# Patient Record
Sex: FEMALE | Race: WHITE | NOT HISPANIC OR LATINO | Employment: FULL TIME | URBAN - METROPOLITAN AREA
[De-identification: names, ages, dates, MRNs, and addresses within clinical notes are randomized per-mention and may not be internally consistent; named-entity substitution may affect disease eponyms.]

---

## 2021-05-13 ENCOUNTER — HOSPITAL ENCOUNTER (EMERGENCY)
Facility: MEDICAL CENTER | Age: 21
End: 2021-05-14
Attending: EMERGENCY MEDICINE
Payer: OTHER GOVERNMENT

## 2021-05-13 ENCOUNTER — APPOINTMENT (OUTPATIENT)
Dept: RADIOLOGY | Facility: MEDICAL CENTER | Age: 21
End: 2021-05-13
Attending: EMERGENCY MEDICINE
Payer: OTHER GOVERNMENT

## 2021-05-13 VITALS
WEIGHT: 126.1 LBS | BODY MASS INDEX: 20.27 KG/M2 | DIASTOLIC BLOOD PRESSURE: 74 MMHG | OXYGEN SATURATION: 94 % | TEMPERATURE: 98.7 F | HEART RATE: 82 BPM | SYSTOLIC BLOOD PRESSURE: 117 MMHG | RESPIRATION RATE: 16 BRPM | HEIGHT: 66 IN

## 2021-05-13 DIAGNOSIS — R55 SYNCOPE, UNSPECIFIED SYNCOPE TYPE: ICD-10-CM

## 2021-05-13 DIAGNOSIS — S02.2XXA CLOSED FRACTURE OF NASAL BONE, INITIAL ENCOUNTER: ICD-10-CM

## 2021-05-13 DIAGNOSIS — R04.0 EPISTAXIS: ICD-10-CM

## 2021-05-13 LAB
ALBUMIN SERPL BCP-MCNC: 4.3 G/DL (ref 3.2–4.9)
ALBUMIN/GLOB SERPL: 1.3 G/DL
ALP SERPL-CCNC: 104 U/L (ref 30–99)
ALT SERPL-CCNC: 18 U/L (ref 2–50)
ANION GAP SERPL CALC-SCNC: 10 MMOL/L (ref 7–16)
AST SERPL-CCNC: 24 U/L (ref 12–45)
BASOPHILS # BLD AUTO: 0.6 % (ref 0–1.8)
BASOPHILS # BLD: 0.04 K/UL (ref 0–0.12)
BILIRUB SERPL-MCNC: 0.2 MG/DL (ref 0.1–1.5)
BUN SERPL-MCNC: 9 MG/DL (ref 8–22)
CALCIUM SERPL-MCNC: 9.3 MG/DL (ref 8.5–10.5)
CHLORIDE SERPL-SCNC: 108 MMOL/L (ref 96–112)
CO2 SERPL-SCNC: 25 MMOL/L (ref 20–33)
CREAT SERPL-MCNC: 0.79 MG/DL (ref 0.5–1.4)
EKG IMPRESSION: NORMAL
EOSINOPHIL # BLD AUTO: 0.05 K/UL (ref 0–0.51)
EOSINOPHIL NFR BLD: 0.7 % (ref 0–6.9)
ERYTHROCYTE [DISTWIDTH] IN BLOOD BY AUTOMATED COUNT: 36.2 FL (ref 35.9–50)
GLOBULIN SER CALC-MCNC: 3.3 G/DL (ref 1.9–3.5)
GLUCOSE SERPL-MCNC: 113 MG/DL (ref 65–99)
HCG SERPL QL: NEGATIVE
HCT VFR BLD AUTO: 45.3 % (ref 37–47)
HGB BLD-MCNC: 15 G/DL (ref 12–16)
IMM GRANULOCYTES # BLD AUTO: 0.02 K/UL (ref 0–0.11)
IMM GRANULOCYTES NFR BLD AUTO: 0.3 % (ref 0–0.9)
LYMPHOCYTES # BLD AUTO: 1.77 K/UL (ref 1–4.8)
LYMPHOCYTES NFR BLD: 26.2 % (ref 22–41)
MAGNESIUM SERPL-MCNC: 2.4 MG/DL (ref 1.5–2.5)
MCH RBC QN AUTO: 29.5 PG (ref 27–33)
MCHC RBC AUTO-ENTMCNC: 33.1 G/DL (ref 33.6–35)
MCV RBC AUTO: 89 FL (ref 81.4–97.8)
MONOCYTES # BLD AUTO: 0.59 K/UL (ref 0–0.85)
MONOCYTES NFR BLD AUTO: 8.7 % (ref 0–13.4)
NEUTROPHILS # BLD AUTO: 4.29 K/UL (ref 2–7.15)
NEUTROPHILS NFR BLD: 63.5 % (ref 44–72)
NRBC # BLD AUTO: 0 K/UL
NRBC BLD-RTO: 0 /100 WBC
PHOSPHATE SERPL-MCNC: 3.2 MG/DL (ref 2.5–4.5)
PLATELET # BLD AUTO: 335 K/UL (ref 164–446)
PMV BLD AUTO: 10.8 FL (ref 9–12.9)
POTASSIUM SERPL-SCNC: 4.3 MMOL/L (ref 3.6–5.5)
PROT SERPL-MCNC: 7.6 G/DL (ref 6–8.2)
RBC # BLD AUTO: 5.09 M/UL (ref 4.2–5.4)
SODIUM SERPL-SCNC: 143 MMOL/L (ref 135–145)
TSH SERPL DL<=0.005 MIU/L-ACNC: 0.39 UIU/ML (ref 0.38–5.33)
WBC # BLD AUTO: 6.8 K/UL (ref 4.8–10.8)

## 2021-05-13 PROCEDURE — 84100 ASSAY OF PHOSPHORUS: CPT

## 2021-05-13 PROCEDURE — 84443 ASSAY THYROID STIM HORMONE: CPT

## 2021-05-13 PROCEDURE — 700102 HCHG RX REV CODE 250 W/ 637 OVERRIDE(OP): Performed by: EMERGENCY MEDICINE

## 2021-05-13 PROCEDURE — 84703 CHORIONIC GONADOTROPIN ASSAY: CPT

## 2021-05-13 PROCEDURE — A9270 NON-COVERED ITEM OR SERVICE: HCPCS | Performed by: EMERGENCY MEDICINE

## 2021-05-13 PROCEDURE — 93005 ELECTROCARDIOGRAM TRACING: CPT | Performed by: EMERGENCY MEDICINE

## 2021-05-13 PROCEDURE — 99284 EMERGENCY DEPT VISIT MOD MDM: CPT

## 2021-05-13 PROCEDURE — 85025 COMPLETE CBC W/AUTO DIFF WBC: CPT

## 2021-05-13 PROCEDURE — 70486 CT MAXILLOFACIAL W/O DYE: CPT

## 2021-05-13 PROCEDURE — 80053 COMPREHEN METABOLIC PANEL: CPT

## 2021-05-13 PROCEDURE — 93005 ELECTROCARDIOGRAM TRACING: CPT

## 2021-05-13 PROCEDURE — 83735 ASSAY OF MAGNESIUM: CPT

## 2021-05-13 PROCEDURE — 700101 HCHG RX REV CODE 250: Performed by: EMERGENCY MEDICINE

## 2021-05-13 RX ORDER — LIDOCAINE HYDROCHLORIDE AND EPINEPHRINE 10; 10 MG/ML; UG/ML
20 INJECTION, SOLUTION INFILTRATION; PERINEURAL ONCE
Status: COMPLETED | OUTPATIENT
Start: 2021-05-13 | End: 2021-05-13

## 2021-05-13 RX ADMIN — PHENYLEPHRINE HYDROCHLORIDE 1 SPRAY: 0.25 SPRAY NASAL at 22:00

## 2021-05-13 RX ADMIN — LIDOCAINE HYDROCHLORIDE,EPINEPHRINE BITARTRATE 20 ML: 10; .01 INJECTION, SOLUTION INFILTRATION; PERINEURAL at 22:00

## 2021-05-14 LAB
APPEARANCE UR: ABNORMAL
BACTERIA #/AREA URNS HPF: NEGATIVE /HPF
BILIRUB UR QL STRIP.AUTO: NEGATIVE
COLOR UR: YELLOW
EPI CELLS #/AREA URNS HPF: NEGATIVE /HPF
GLUCOSE UR STRIP.AUTO-MCNC: NEGATIVE MG/DL
HYALINE CASTS #/AREA URNS LPF: ABNORMAL /LPF
KETONES UR STRIP.AUTO-MCNC: NEGATIVE MG/DL
LEUKOCYTE ESTERASE UR QL STRIP.AUTO: ABNORMAL
MICRO URNS: ABNORMAL
NITRITE UR QL STRIP.AUTO: NEGATIVE
PH UR STRIP.AUTO: 7.5 [PH] (ref 5–8)
PROT UR QL STRIP: NEGATIVE MG/DL
RBC # URNS HPF: ABNORMAL /HPF
RBC UR QL AUTO: NEGATIVE
SP GR UR STRIP.AUTO: 1.02
UROBILINOGEN UR STRIP.AUTO-MCNC: 0.2 MG/DL
WBC #/AREA URNS HPF: ABNORMAL /HPF

## 2021-05-14 PROCEDURE — 81001 URINALYSIS AUTO W/SCOPE: CPT

## 2021-05-14 NOTE — ED NOTES
Pt to [facility] from [facility], pt in gown and on monitor, call light in reach, chart up for ERP. Pt noted to be eating a meal in [facility], instructed to hold off on eating until ER workup is complete.

## 2021-05-14 NOTE — ED NOTES
Lunch RN: EP to bedside to update pt and family on POC. Plan at this time is for discharge. UA collected and sent to lab.

## 2021-05-14 NOTE — ED TRIAGE NOTES
Chief Complaint   Patient presents with   • Syncope     pt reports getting abdominal pain and going to bathroom and had a syncopal event while on the toilet around 1830, pt reports history of the same   • Facial Pain     pt hit face during sncopal event, reports nose pain and cheek pain     Pt ambulatory to triage with steady gait for above complaint. Pt reports history of similar events over the past 4 years. Pt denies health history besides PCOS. Pt in NAD in triage. VSS.

## 2021-05-14 NOTE — ED NOTES
Pt reminded on need for urine sample. Pt states she does not need to urinate at this time. Will continue to round for specimen collection

## 2021-05-14 NOTE — ED PROVIDER NOTES
ED Provider Note    CHIEF COMPLAINT  Chief Complaint   Patient presents with   • Syncope     pt reports getting abdominal pain and going to bathroom and had a syncopal event while on the toilet around 1830, pt reports history of the same   • Facial Pain     pt hit face during sncopal event, reports nose pain and cheek pain       HPI  Lamar Bonilla is a 20 y.o. female who presents to the emergency department with complaint of syncopal episode.  The patient states that she stood up to go to the restroom, she had severe abdominal pain and passed out.  The patient was found the ground by her roommate and she was unconscious for about 15 to 20 seconds.  She had no seizure-like activity and awoke without any confusion.  The patient states this is happened 3 times in the past and has had multiple evaluations for this in unknown etiology.  She initially is abdominal pain and then she passes out and the pain goes away.  She does have a history of polycystic ovarian syndrome in the past.  Currently she denies any abdominal pain, she states she is not sure when her last menstrual cycle was secondary to being on birth control, denies sexual activity, denies headache.  She does endorse that she had right nare epistaxis as well as right nose pain.  Denies loss of sensation or strength in arms or legs, neck pain, back pain, current chest pain, abdominal pain, trauma to arms or legs bilaterally.    REVIEW OF SYSTEMS  Positives as above. Pertinent negatives include vaginal bleeding, fever, IV drug use, alcohol use  All other 10 review of systems are negative    PAST MEDICAL HISTORY  History reviewed. No pertinent past medical history.    FAMILY HISTORY  Noncontributory    SOCIAL HISTORY  Social History     Socioeconomic History   • Marital status: Single     Spouse name: Not on file   • Number of children: Not on file   • Years of education: Not on file   • Highest education level: Not on file   Occupational History   • Not  "on file   Tobacco Use   • Smoking status: Never Smoker   • Smokeless tobacco: Never Used   Substance and Sexual Activity   • Alcohol use: Never   • Drug use: Never   • Sexual activity: Not on file   Other Topics Concern   • Not on file   Social History Narrative   • Not on file     Social Determinants of Health     Financial Resource Strain:    • Difficulty of Paying Living Expenses:    Food Insecurity:    • Worried About Running Out of Food in the Last Year:    • Ran Out of Food in the Last Year:    Transportation Needs:    • Lack of Transportation (Medical):    • Lack of Transportation (Non-Medical):    Physical Activity:    • Days of Exercise per Week:    • Minutes of Exercise per Session:    Stress:    • Feeling of Stress :    Social Connections:    • Frequency of Communication with Friends and Family:    • Frequency of Social Gatherings with Friends and Family:    • Attends Taoism Services:    • Active Member of Clubs or Organizations:    • Attends Club or Organization Meetings:    • Marital Status:    Intimate Partner Violence:    • Fear of Current or Ex-Partner:    • Emotionally Abused:    • Physically Abused:    • Sexually Abused:        SURGICAL HISTORY  History reviewed. No pertinent surgical history.    CURRENT MEDICATIONS  Home Medications     Reviewed by Edwin Martinez R.N. (Registered Nurse) on 05/13/21 at [email or web address]  Med List Status: Not Addressed   Medication Last Dose Status        Patient Rohit Taking any Medications                       ALLERGIES  No Known Allergies    PHYSICAL EXAM  VITAL SIGNS: /74   Pulse 82   Temp 37.1 °C (98.7 °F) (Temporal)   Resp 16   Ht 1.676 m (5' 6\")   Wt 57.2 kg (126 lb 1.7 oz)   SpO2 94%   BMI 20.35 kg/m²      Constitutional: Well developed, Well nourished, No acute distress, Non-toxic appearance.   Eyes: PERRLA, EOMI, Conjunctiva normal, No discharge.   HENT: Slight epistaxis from the right nare, nasal bridge tenderness, no significant deformity, negative " joe signs, negative raccoon eyes, no dental trauma, slight abrasion to the upper lip  Cardiovascular: Normal heart rate, Normal rhythm, No murmurs, No rubs, No gallops, and intact distal pulses.   Thorax & Lungs:  No respiratory distress, no rales, no rhonchi, No wheezing, No chest wall tenderness.   Abdomen: Bowel sounds normal, Soft, No tenderness, No guarding, No rebound, No pulsatile masses.   Skin: Warm, slight abrasion to the upper lip, slight abrasion to the left lateral nare dry, No erythema, No rash.   Extremities: Full range of motion, no deformity, no edema.  Neurologic:  Alert & oriented to month and age, Normal cognition, Cranial nerves II-XII are intact, No slurred speech, Negative finger to nose bilaterally, No pronator drift bilaterally,   strength 5/5 bilaterally, Leg raise strength 5/5 bilaterally, Plantarflexion strength 5/5 bilaterally, Dorsiflexion strength 5/5 bilaterally, Deep tendon reflexes 2/4 upper and lower extremities bilaterally, Sensation intact throughout, No Nystagmus.  Psychiatric: Affect normal for clinical presentation.      RADIOLOGY/PROCEDURES  CT-MAXILLOFACIAL W/O PLUS RECONS   Final Result         1.  Possible minimally displaced nasal bone tip fracture, otherwise no acute traumatic facial bony injuries identified.   2.  Abnormal bony pneumatization of the bilateral maxillary sinuses   3.  Sinusitis changes as described        Results for orders placed or performed during the hospital encounter of 05/13/21   CBC w/ Differential   Result Value Ref Range    WBC 6.8 4.8 - 10.8 K/uL    RBC 5.09 4.20 - 5.40 M/uL    Hemoglobin 15.0 12.0 - 16.0 g/dL    Hematocrit 45.3 37.0 - 47.0 %    MCV 89.0 81.4 - 97.8 fL    MCH 29.5 27.0 - 33.0 pg    MCHC 33.1 (L) 33.6 - 35.0 g/dL    RDW 36.2 35.9 - 50.0 fL    Platelet Count 335 164 - 446 K/uL    MPV 10.8 9.0 - 12.9 fL    Neutrophils-Polys 63.50 44.00 - 72.00 %    Lymphocytes 26.20 22.00 - 41.00 %    Monocytes 8.70 0.00 - 13.40 %     Eosinophils 0.70 0.00 - 6.90 %    Basophils 0.60 0.00 - 1.80 %    Immature Granulocytes 0.30 0.00 - 0.90 %    Nucleated RBC 0.00 /100 WBC    Neutrophils (Absolute) 4.29 2.00 - 7.15 K/uL    Lymphs (Absolute) 1.77 1.00 - 4.80 K/uL    Monos (Absolute) 0.59 0.00 - 0.85 K/uL    Eos (Absolute) 0.05 0.00 - 0.51 K/uL    Baso (Absolute) 0.04 0.00 - 0.12 K/uL    Immature Granulocytes (abs) 0.02 0.00 - 0.11 K/uL    NRBC (Absolute) 0.00 K/uL   Complete Metabolic Panel (CMP)   Result Value Ref Range    Sodium 143 135 - 145 mmol/L    Potassium 4.3 3.6 - 5.5 mmol/L    Chloride 108 96 - 112 mmol/L    Co2 25 20 - 33 mmol/L    Anion Gap 10.0 7.0 - 16.0    Glucose 113 (H) 65 - 99 mg/dL    Bun 9 8 - 22 mg/dL    Creatinine 0.79 0.50 - 1.40 mg/dL    Calcium 9.3 8.5 - 10.5 mg/dL    AST(SGOT) 24 12 - 45 U/L    ALT(SGPT) 18 2 - 50 U/L    Alkaline Phosphatase 104 (H) 30 - 99 U/L    Total Bilirubin 0.2 0.1 - 1.5 mg/dL    Albumin 4.3 3.2 - 4.9 g/dL    Total Protein 7.6 6.0 - 8.2 g/dL    Globulin 3.3 1.9 - 3.5 g/dL    A-G Ratio 1.3 g/dL   Magnesium   Result Value Ref Range    Magnesium 2.4 1.5 - 2.5 mg/dL   Phosphorus   Result Value Ref Range    Phosphorus 3.2 2.5 - 4.5 mg/dL   TSH   Result Value Ref Range    TSH 0.390 0.380 - 5.330 uIU/mL   HCG QUAL SERUM   Result Value Ref Range    Beta-Hcg Qualitative Serum Negative Negative   ESTIMATED GFR   Result Value Ref Range    GFR If African American >60 >60 mL/min/1.73 m 2    GFR If Non African American >60 >60 mL/min/1.73 m 2   EKG   Result Value Ref Range    Report       [facility] Emergency Dept.    Test Date:  2021  Pt Name:    CHELI LEE                Department: ER  MRN:        [id number]                      Room:  Gender:     Female                       Technician: [id number]  :        [id number]                   Requested By:ER TRIAGE PROTOCOL  Order #:    [id number]                    Reading MD: CHARITY MARTINEZ, DO    Measurements  Intervals                                 Axis  Rate:       95                           P:          68  UT:         135                          QRS:        63  QRSD:       69                           T:          -73  QT:         389  QTc:        489    Interpretive Statements  Sinus rhythm  Nonspecific T abnormalities, diffuse leads  Borderline prolonged QT interval  No previous ECG available for comparison  Electronically Signed On 5- 21:39:42 PDT by CHARITY MARTINEZ DO           COURSE & MEDICAL DECISION MAKING  Pertinent Labs & Imaging studies reviewed. (See chart for details)  This is a pleasant 20-year-old female presents with single episode.  Here in the emergency department, she is asymptomatic except for the trauma to her face.  She has no abdominal pain suspicious for ectopic pregnancy and she is not pregnant as well.  In addition, the patient has had no painful urination, no dysuria, she is afebrile.  She has a soft, nontender, nonsurgical abdomen.  CT scan maxillofacial was completed revealed evidence of possible nasal fracture with minimal displacement if fracture at all.  She the patient does have evidence of chronic sinusitis as well as well bony pneumatization.  The patient does not have evidence Brugada syndrome, prolonged QTC, ectopy, WPW.  She states when she has severe abdominal pain she passes out afterwards.  She does have polycystic ovarian syndrome admitted with ovarian cyst rupture but this point time she has no abdominal tenderness I do not suspect intra-abdominal etiology for her syncopal episode.  Patient probably experiencing vasovagal.  I recommend she follow-up with an outpatient evaluation for possible Holter monitor as well as syncope episode.  She is going back to [place] to her hometown and she will be following up there.  The patient be discharged with the CD of the CT findings and be following with Dr. Crenshaw, facial fracture surgeon on-call.    FINAL IMPRESSION     1. Syncope, unspecified  syncope type Active   2. Closed fracture of nasal bone, initial encounter Active   3. Epistaxis        DISPOSITION:  Patient will be discharged home in stable condition.    FOLLOW UP:  [facility] Emergency Dept  [address]  [place] [id number] [phone]    If symptoms worsen    Arturo Crenshaw M.D.  [address]  [facility] [address]  [phone]    Schedule an appointment as soon as possible for a visit in 3 days  As needed    Arturo Crenshaw M.D.  [address]  [facility] [address]  [phone]               Electronically signed by: Terry Perez D.O., 5/13/2021 10:36 PM